# Patient Record
Sex: MALE | Race: WHITE | Employment: OTHER | URBAN - METROPOLITAN AREA
[De-identification: names, ages, dates, MRNs, and addresses within clinical notes are randomized per-mention and may not be internally consistent; named-entity substitution may affect disease eponyms.]

---

## 2020-01-05 ENCOUNTER — HOSPITAL ENCOUNTER (OUTPATIENT)
Dept: MRI IMAGING | Age: 65
Discharge: HOME | End: 2020-01-05
Attending: OPHTHALMOLOGY
Payer: COMMERCIAL

## 2020-01-05 DIAGNOSIS — H49.02 THIRD (OCULOMOTOR) NERVE PALSY, LEFT EYE: ICD-10-CM

## 2020-01-06 ENCOUNTER — HOSPITAL ENCOUNTER (OUTPATIENT)
Dept: MRI IMAGING | Age: 65
Discharge: HOME | End: 2020-01-06
Attending: OPHTHALMOLOGY
Payer: COMMERCIAL

## 2020-01-06 DIAGNOSIS — H49.02 THIRD (OCULOMOTOR) NERVE PALSY, LEFT EYE: ICD-10-CM

## 2020-01-06 LAB — CREAT BLD-MCNC: 1.3 MG/DL (ref 0.7–1.3)

## 2020-01-06 PROCEDURE — 70543 MRI ORBT/FAC/NCK W/O &W/DYE: CPT | Performed by: OPHTHALMOLOGY

## 2020-01-06 PROCEDURE — 70553 MRI BRAIN STEM W/O & W/DYE: CPT | Performed by: OPHTHALMOLOGY

## 2020-01-06 PROCEDURE — 82565 ASSAY OF CREATININE: CPT

## 2020-01-06 PROCEDURE — A9575 INJ GADOTERATE MEGLUMI 0.1ML: HCPCS | Performed by: OPHTHALMOLOGY

## 2020-01-27 ENCOUNTER — LAB ENCOUNTER (OUTPATIENT)
Dept: LAB | Age: 65
End: 2020-01-27
Attending: OPHTHALMOLOGY
Payer: COMMERCIAL

## 2020-01-27 ENCOUNTER — APPOINTMENT (OUTPATIENT)
Dept: LAB | Age: 65
End: 2020-01-27
Payer: COMMERCIAL

## 2020-01-27 DIAGNOSIS — H49.02 THIRD NERVE PALSY OF LEFT EYE: Primary | ICD-10-CM

## 2020-01-27 LAB
ACETONE SERPL QL: NEGATIVE
CRP SERPL HS-MCNC: 2.46 MG/L (ref ?–3)
ERYTHROCYTE [SEDIMENTATION RATE] IN BLOOD: 19 MM/HR (ref 0–20)

## 2020-01-27 PROCEDURE — 82009 KETONE BODYS QUAL: CPT

## 2020-01-27 PROCEDURE — 85652 RBC SED RATE AUTOMATED: CPT

## 2020-01-27 PROCEDURE — 86038 ANTINUCLEAR ANTIBODIES: CPT

## 2020-01-27 PROCEDURE — 36415 COLL VENOUS BLD VENIPUNCTURE: CPT

## 2020-01-27 PROCEDURE — 86592 SYPHILIS TEST NON-TREP QUAL: CPT

## 2020-01-27 PROCEDURE — 86780 TREPONEMA PALLIDUM: CPT

## 2020-01-27 PROCEDURE — 86141 C-REACTIVE PROTEIN HS: CPT

## 2020-01-28 LAB — RAPID PLASMA REAGIN (RPR): NON REACTIVE

## 2020-01-29 LAB
T PALLIDUM AB SER QL: NEGATIVE
TREPONEMA PALLIDUM AB BY TP-PA: NON REACTIVE

## 2020-01-30 LAB — NUCLEAR IGG TITR SER IF: NEGATIVE {TITER}

## 2020-02-22 ENCOUNTER — HOSPITAL ENCOUNTER (OUTPATIENT)
Dept: MRI IMAGING | Facility: HOSPITAL | Age: 65
Discharge: HOME | End: 2020-02-22
Attending: OPHTHALMOLOGY
Payer: COMMERCIAL

## 2020-02-22 DIAGNOSIS — H49.02: ICD-10-CM

## 2020-02-22 PROCEDURE — 70544 MR ANGIOGRAPHY HEAD W/O DYE: CPT | Performed by: OPHTHALMOLOGY

## 2022-09-02 ENCOUNTER — OFFICE VISIT (OUTPATIENT)
Dept: PODIATRY CLINIC | Facility: CLINIC | Age: 67
End: 2022-09-02
Payer: MEDICARE

## 2022-09-02 DIAGNOSIS — E11.42 TYPE 2 DIABETES MELLITUS WITH POLYNEUROPATHY (HCC): Primary | ICD-10-CM

## 2022-09-02 DIAGNOSIS — L84 FOOT CALLUS: ICD-10-CM

## 2022-09-02 DIAGNOSIS — M21.6X2 PLANTAR FLEXED METATARSAL BONE OF LEFT FOOT: ICD-10-CM

## 2022-09-02 DIAGNOSIS — M79.672 LEFT FOOT PAIN: ICD-10-CM

## 2022-09-02 RX ORDER — GLIMEPIRIDE 2 MG/1
2 TABLET ORAL
COMMUNITY
Start: 2019-12-06

## 2022-09-02 RX ORDER — ATORVASTATIN CALCIUM 20 MG/1
1 TABLET, FILM COATED ORAL DAILY
COMMUNITY
Start: 2020-01-30

## 2022-09-02 RX ORDER — BUPROPION HYDROCHLORIDE 150 MG/1
1 TABLET, EXTENDED RELEASE ORAL 2 TIMES DAILY
COMMUNITY
Start: 2020-01-26

## 2022-09-02 RX ORDER — ASPIRIN 81 MG/1
81 TABLET, CHEWABLE ORAL AS DIRECTED
COMMUNITY

## 2022-09-06 ENCOUNTER — OFFICE VISIT (OUTPATIENT)
Dept: OTOLARYNGOLOGY | Facility: CLINIC | Age: 67
End: 2022-09-06
Payer: MEDICARE

## 2022-09-06 VITALS — WEIGHT: 275 LBS | HEIGHT: 69 IN | TEMPERATURE: 98 F | BODY MASS INDEX: 40.73 KG/M2

## 2022-09-06 DIAGNOSIS — H60.8X3 CHRONIC ECZEMATOUS OTITIS EXTERNA OF BOTH EARS: Primary | ICD-10-CM

## 2022-09-06 PROCEDURE — 99213 OFFICE O/P EST LOW 20 MIN: CPT | Performed by: SPECIALIST

## 2022-09-06 PROCEDURE — 3008F BODY MASS INDEX DOCD: CPT | Performed by: SPECIALIST

## 2022-09-06 RX ORDER — FLUOCINOLONE ACETONIDE 0.11 MG/ML
4 OIL AURICULAR (OTIC) NIGHTLY PRN
Qty: 20 ML | Refills: 2 | Status: SHIPPED | OUTPATIENT
Start: 2022-09-06 | End: 2022-10-06

## 2022-09-06 NOTE — PATIENT INSTRUCTIONS
I placed you on dermotic drops at bedtime as needed for chronic eczematoid otitis externa. Follow up or contact me with further problems or questions.

## 2022-10-14 ENCOUNTER — TELEPHONE (OUTPATIENT)
Dept: INTERNAL MEDICINE CLINIC | Facility: CLINIC | Age: 67
End: 2022-10-14

## 2022-10-14 ENCOUNTER — PATIENT MESSAGE (OUTPATIENT)
Dept: INTERNAL MEDICINE CLINIC | Facility: CLINIC | Age: 67
End: 2022-10-14

## 2022-10-14 ENCOUNTER — LAB ENCOUNTER (OUTPATIENT)
Dept: LAB | Age: 67
End: 2022-10-14
Attending: INTERNAL MEDICINE
Payer: MEDICARE

## 2022-10-14 DIAGNOSIS — E11.9 TYPE 2 DIABETES MELLITUS WITHOUT COMPLICATION, WITHOUT LONG-TERM CURRENT USE OF INSULIN (HCC): Primary | ICD-10-CM

## 2022-10-14 DIAGNOSIS — E11.9 TYPE 2 DIABETES MELLITUS WITHOUT COMPLICATION, WITHOUT LONG-TERM CURRENT USE OF INSULIN (HCC): ICD-10-CM

## 2022-10-14 DIAGNOSIS — Z13.228 SCREENING FOR ENDOCRINE, METABOLIC AND IMMUNITY DISORDER: ICD-10-CM

## 2022-10-14 DIAGNOSIS — Z13.29 SCREENING FOR ENDOCRINE, METABOLIC AND IMMUNITY DISORDER: ICD-10-CM

## 2022-10-14 DIAGNOSIS — Z13.0 SCREENING FOR ENDOCRINE, METABOLIC AND IMMUNITY DISORDER: ICD-10-CM

## 2022-10-14 DIAGNOSIS — Z12.5 ENCOUNTER FOR SCREENING FOR MALIGNANT NEOPLASM OF PROSTATE: ICD-10-CM

## 2022-10-14 PROBLEM — F41.8 DEPRESSION WITH ANXIETY: Status: ACTIVE | Noted: 2022-10-14

## 2022-10-14 PROBLEM — E11.59 HYPERTENSION ASSOCIATED WITH TYPE 2 DIABETES MELLITUS: Status: ACTIVE | Noted: 2022-10-14

## 2022-10-14 PROBLEM — E11.59 HYPERTENSION ASSOCIATED WITH TYPE 2 DIABETES MELLITUS (HCC): Status: ACTIVE | Noted: 2022-10-14

## 2022-10-14 PROBLEM — E66.01 CLASS 3 SEVERE OBESITY DUE TO EXCESS CALORIES WITH SERIOUS COMORBIDITY AND BODY MASS INDEX (BMI) OF 45.0 TO 49.9 IN ADULT (HCC): Status: ACTIVE | Noted: 2022-10-14

## 2022-10-14 PROBLEM — E78.5 HYPERLIPIDEMIA ASSOCIATED WITH TYPE 2 DIABETES MELLITUS (HCC): Status: ACTIVE | Noted: 2022-10-14

## 2022-10-14 PROBLEM — E78.5 HYPERLIPIDEMIA ASSOCIATED WITH TYPE 2 DIABETES MELLITUS: Status: ACTIVE | Noted: 2022-10-14

## 2022-10-14 PROBLEM — E11.69 HYPERLIPIDEMIA ASSOCIATED WITH TYPE 2 DIABETES MELLITUS (HCC): Status: ACTIVE | Noted: 2022-10-14

## 2022-10-14 PROBLEM — E11.69 HYPERLIPIDEMIA ASSOCIATED WITH TYPE 2 DIABETES MELLITUS: Status: ACTIVE | Noted: 2022-10-14

## 2022-10-14 PROBLEM — I15.2 HYPERTENSION ASSOCIATED WITH TYPE 2 DIABETES MELLITUS (HCC): Status: ACTIVE | Noted: 2022-10-14

## 2022-10-14 PROBLEM — I15.2 HYPERTENSION ASSOCIATED WITH TYPE 2 DIABETES MELLITUS: Status: ACTIVE | Noted: 2022-10-14

## 2022-10-14 PROBLEM — E66.813 CLASS 3 SEVERE OBESITY DUE TO EXCESS CALORIES WITH SERIOUS COMORBIDITY AND BODY MASS INDEX (BMI) OF 45.0 TO 49.9 IN ADULT (HCC): Status: ACTIVE | Noted: 2022-10-14

## 2022-10-14 PROBLEM — E66.813 CLASS 3 SEVERE OBESITY DUE TO EXCESS CALORIES WITH SERIOUS COMORBIDITY AND BODY MASS INDEX (BMI) OF 45.0 TO 49.9 IN ADULT: Status: ACTIVE | Noted: 2022-10-14

## 2022-10-14 LAB
COMPLEXED PSA SERPL-MCNC: 1.21 NG/ML (ref ?–4)
CREAT UR-SCNC: 156 MG/DL
DEPRECATED RDW RBC AUTO: 38.5 FL (ref 35.1–46.3)
ERYTHROCYTE [DISTWIDTH] IN BLOOD BY AUTOMATED COUNT: 12 % (ref 11–15)
HCT VFR BLD AUTO: 47.6 %
HGB BLD-MCNC: 15.4 G/DL
MCH RBC QN AUTO: 28.1 PG (ref 26–34)
MCHC RBC AUTO-ENTMCNC: 32.4 G/DL (ref 31–37)
MCV RBC AUTO: 86.9 FL
MICROALBUMIN UR-MCNC: 1.48 MG/DL
MICROALBUMIN/CREAT 24H UR-RTO: 9.5 UG/MG (ref ?–30)
PLATELET # BLD AUTO: 226 10(3)UL (ref 150–450)
RBC # BLD AUTO: 5.48 X10(6)UL
TSI SER-ACNC: 3.41 MIU/ML (ref 0.36–3.74)
WBC # BLD AUTO: 8.5 X10(3) UL (ref 4–11)

## 2022-10-14 PROCEDURE — 80061 LIPID PANEL: CPT | Performed by: INTERNAL MEDICINE

## 2022-10-14 PROCEDURE — 82570 ASSAY OF URINE CREATININE: CPT

## 2022-10-14 PROCEDURE — 83036 HEMOGLOBIN GLYCOSYLATED A1C: CPT | Performed by: INTERNAL MEDICINE

## 2022-10-14 PROCEDURE — 80053 COMPREHEN METABOLIC PANEL: CPT | Performed by: INTERNAL MEDICINE

## 2022-10-14 PROCEDURE — 85027 COMPLETE CBC AUTOMATED: CPT

## 2022-10-14 PROCEDURE — 82043 UR ALBUMIN QUANTITATIVE: CPT

## 2022-10-14 PROCEDURE — 84443 ASSAY THYROID STIM HORMONE: CPT

## 2022-10-14 PROCEDURE — 36415 COLL VENOUS BLD VENIPUNCTURE: CPT

## 2022-10-14 NOTE — TELEPHONE ENCOUNTER
From: Yue Mcghee  To: Lonnie Hamilton MD  Sent: 10/14/2022 12:38 PM CDT  Subject: Medications    Hi Doctor,    Just a follow up from my visit this morning. on your instructions, you stated that you were going to prescribe TELMISARTAN 40 mg. But looking at the list of Rx you sent to [facility] I don't see it. Also just as an [place] I called the insurance company, the [facility] is not covered.     Thank Juventino Parish

## 2022-10-14 NOTE — TELEPHONE ENCOUNTER
Tanika from [facility] is following up on medication questions. May leave a message if unable to take call.     Phone # [phone]

## 2022-10-14 NOTE — TELEPHONE ENCOUNTER
Tanika from [address] [facility] is calling to advise PCP they received the electronic prescription and are advising the insurance does not cover the following medication,    Tirzepatide  Are you able to prescribe OZEMPIC    Requesting a call back to go over the other medications prescriptions received as there are alternatives  with low copays.      Please call back at [phone]

## 2022-10-17 RX ORDER — BLOOD SUGAR DIAGNOSTIC
STRIP MISCELLANEOUS
Qty: 100 STRIP | Refills: 3 | Status: SHIPPED | OUTPATIENT
Start: 2022-10-17

## 2022-10-17 RX ORDER — BLOOD-GLUCOSE METER
1 EACH MISCELLANEOUS 2 TIMES DAILY
Qty: 1 KIT | Refills: 0 | Status: SHIPPED | OUTPATIENT
Start: 2022-10-17

## 2022-10-17 RX ORDER — LANCETS
EACH MISCELLANEOUS
Qty: 100 EACH | Refills: 3 | Status: SHIPPED | OUTPATIENT
Start: 2022-10-17

## 2022-10-17 RX ORDER — ISOPROPYL ALCOHOL 0.7 ML/1
SWAB TOPICAL
Qty: 100 EACH | Refills: 3 | Status: SHIPPED | OUTPATIENT
Start: 2022-10-17

## 2022-10-17 NOTE — TELEPHONE ENCOUNTER
The pharmacy will not tell us the cost and wants a prescription but if we send the prescription it results and issues like this. The only thing the pharmacist needs to do is tell us/patient the cost and then it is up to the patient to decide if they want to use this medication, patient has already been instructed if they start these injectables to let us know so I can readdress the current medications. In addition I already gave him an order for diabetes test strips/supplies, these were printed in the office. (It appears the pharmacy is not reading the note to pharmacy nor the signature as it states these rx are for insurance inquiry).

## 2022-10-17 NOTE — TELEPHONE ENCOUNTER
New porter from [facility] is following up. She states she still has questions regarding medication sent on 10/14. Please advise.

## 2022-10-17 NOTE — TELEPHONE ENCOUNTER
Called pharmacy, and spoke to Wilson de Pas (not New porter per note below). I did review the newer meds for diabetes. She wants to make sure that provider wants glimepiride 2 MG Oral Tab,  metFORMIN 1000 MG Oral Tab to continue as well as the others for diabetes. Per pharmacist, patient needs test strips, meter, lancets, tavares device, alcohol wipes. She needs these sent in electronically. How often to check blood sugar daily so we can order the supplies for him?      Please advise

## 2022-10-19 ENCOUNTER — TELEPHONE (OUTPATIENT)
Dept: INTERNAL MEDICINE CLINIC | Facility: CLINIC | Age: 67
End: 2022-10-19

## 2022-10-19 DIAGNOSIS — E11.9 TYPE 2 DIABETES MELLITUS WITHOUT COMPLICATION, WITHOUT LONG-TERM CURRENT USE OF INSULIN (HCC): ICD-10-CM

## 2022-10-19 NOTE — TELEPHONE ENCOUNTER
See medication record, Dell Perez  was paper prescription 10/14/22. RN re sent the electronic script today to [facility].

## 2022-10-19 NOTE — TELEPHONE ENCOUNTER
Tanika from [address] - would like to see if patient can get a prescription for Ozempic.

## 2022-12-01 ENCOUNTER — OFFICE VISIT (OUTPATIENT)
Dept: INTERNAL MEDICINE CLINIC | Facility: CLINIC | Age: 67
End: 2022-12-01
Payer: MEDICARE

## 2022-12-01 VITALS
WEIGHT: 290 LBS | BODY MASS INDEX: 42.95 KG/M2 | SYSTOLIC BLOOD PRESSURE: 103 MMHG | HEIGHT: 69 IN | DIASTOLIC BLOOD PRESSURE: 68 MMHG | HEART RATE: 94 BPM

## 2022-12-01 DIAGNOSIS — E11.9 TYPE 2 DIABETES MELLITUS WITHOUT COMPLICATION, WITHOUT LONG-TERM CURRENT USE OF INSULIN (HCC): ICD-10-CM

## 2022-12-01 DIAGNOSIS — E11.59 HYPERTENSION ASSOCIATED WITH TYPE 2 DIABETES MELLITUS (HCC): ICD-10-CM

## 2022-12-01 DIAGNOSIS — I15.2 HYPERTENSION ASSOCIATED WITH TYPE 2 DIABETES MELLITUS (HCC): ICD-10-CM

## 2022-12-01 DIAGNOSIS — E11.69 HYPERLIPIDEMIA ASSOCIATED WITH TYPE 2 DIABETES MELLITUS (HCC): ICD-10-CM

## 2022-12-01 DIAGNOSIS — E78.5 HYPERLIPIDEMIA ASSOCIATED WITH TYPE 2 DIABETES MELLITUS (HCC): ICD-10-CM

## 2022-12-01 RX ORDER — ATORVASTATIN CALCIUM 40 MG/1
40 TABLET, FILM COATED ORAL NIGHTLY
Qty: 90 TABLET | Refills: 9 | Status: SHIPPED | OUTPATIENT
Start: 2022-12-01

## 2022-12-01 RX ORDER — EMPAGLIFLOZIN 25 MG/1
1 TABLET, FILM COATED ORAL DAILY
Qty: 90 TABLET | Refills: 4 | Status: SHIPPED | OUTPATIENT
Start: 2022-12-01

## 2022-12-01 RX ORDER — GLIMEPIRIDE 1 MG/1
1 TABLET ORAL 2 TIMES DAILY PRN
Qty: 60 TABLET | Refills: 0 | Status: SHIPPED | OUTPATIENT
Start: 2022-12-01

## 2022-12-01 RX ORDER — TELMISARTAN 40 MG/1
40 TABLET ORAL NIGHTLY
Qty: 90 TABLET | Refills: 9 | Status: SHIPPED | OUTPATIENT
Start: 2022-12-01

## 2022-12-01 RX ORDER — SEMAGLUTIDE 1.34 MG/ML
1 INJECTION, SOLUTION SUBCUTANEOUS WEEKLY
Qty: 3 EACH | Refills: 3 | Status: SHIPPED | OUTPATIENT
Start: 2022-12-01

## 2022-12-01 NOTE — PATIENT INSTRUCTIONS
1.  We will continue with telmisartan 40 mg nightly for your blood pressure   2. We will continue with atorvastatin but we will increase from 20 mg nightly to 40 mg nightly, I have sent a new prescription but you may double up on your current 20 mg and take 2 of them at night. 3.  Because you are on quite a few diabetes medications and you are noticing your blood sugar start to be, less than 100 we will now readjust some of your medications.  -Continue Jardiance 25 mg, this medication does not cause low blood sugar  -Continue metformin at 1000 mg twice daily, it is okay to omit the tablets or the entire day dose if your blood sugars are in the low 100s to 120s. -We will increase Ozempic from 0.5 mg to 1 mg, this 1 mg dose will start to produce weight loss and reductions in your blood sugar. --The combination of Ozempic, Jardiance, metformin does not cause hypoglycemia unless you are not eating or vigorously exercising. At this point in time you will stop taking glimepiride and only use this if your blood sugars are greater than 140 after taking Jardiance, metformin, and your weekly dose of Ozempic. Glimepiride is the only medication that you have that is likely to cause hypoglycemia, this is the very first medication we are going to get rid of. Once you are off this medication you will not need to keep checking your blood sugars unless you feel unwell. Over the next few weeks with the higher dose of Ozempic you will start to notice some nausea, some upset stomach but this should resolve. If you are ever unsure it is perfectly fine to not take your oral medications and keep monitoring your blood sugars, let me know. This will require some monitoring on your and until we get you stable. Our goal is to get you on Ozempic alone or Ozempic with Jardiance depending on how you do.

## 2023-01-23 ENCOUNTER — LAB ENCOUNTER (OUTPATIENT)
Dept: LAB | Age: 68
End: 2023-01-23
Attending: INTERNAL MEDICINE
Payer: MEDICARE

## 2023-01-23 DIAGNOSIS — E11.69 HYPERLIPIDEMIA ASSOCIATED WITH TYPE 2 DIABETES MELLITUS (HCC): ICD-10-CM

## 2023-01-23 DIAGNOSIS — E11.9 TYPE 2 DIABETES MELLITUS WITHOUT COMPLICATION, WITHOUT LONG-TERM CURRENT USE OF INSULIN (HCC): ICD-10-CM

## 2023-01-23 DIAGNOSIS — E78.5 HYPERLIPIDEMIA ASSOCIATED WITH TYPE 2 DIABETES MELLITUS (HCC): ICD-10-CM

## 2023-01-23 DIAGNOSIS — E11.59 HYPERTENSION ASSOCIATED WITH TYPE 2 DIABETES MELLITUS (HCC): ICD-10-CM

## 2023-01-23 DIAGNOSIS — I15.2 HYPERTENSION ASSOCIATED WITH TYPE 2 DIABETES MELLITUS (HCC): ICD-10-CM

## 2023-01-23 LAB
ALBUMIN SERPL-MCNC: 4.4 G/DL (ref 3.4–5)
ALBUMIN/GLOB SERPL: 1.2 {RATIO} (ref 1–2)
ALP LIVER SERPL-CCNC: 104 U/L
ALT SERPL-CCNC: 23 U/L
ANION GAP SERPL CALC-SCNC: 5 MMOL/L (ref 0–18)
AST SERPL-CCNC: 14 U/L (ref 15–37)
BILIRUB SERPL-MCNC: 0.5 MG/DL (ref 0.1–2)
BUN BLD-MCNC: 28 MG/DL (ref 7–18)
BUN/CREAT SERPL: 16.3 (ref 10–20)
CALCIUM BLD-MCNC: 10 MG/DL (ref 8.5–10.1)
CHLORIDE SERPL-SCNC: 105 MMOL/L (ref 98–112)
CHOLEST SERPL-MCNC: 89 MG/DL (ref ?–200)
CO2 SERPL-SCNC: 27 MMOL/L (ref 21–32)
CREAT BLD-MCNC: 1.72 MG/DL
CREAT UR-SCNC: 147 MG/DL
EST. AVERAGE GLUCOSE BLD GHB EST-MCNC: 105 MG/DL (ref 68–126)
FASTING PATIENT LIPID ANSWER: YES
FASTING STATUS PATIENT QL REPORTED: YES
GFR SERPLBLD BASED ON 1.73 SQ M-ARVRAT: 43 ML/MIN/1.73M2 (ref 60–?)
GLOBULIN PLAS-MCNC: 3.7 G/DL (ref 2.8–4.4)
GLUCOSE BLD-MCNC: 50 MG/DL (ref 70–99)
HBA1C MFR BLD: 5.3 % (ref ?–5.7)
HDLC SERPL-MCNC: 36 MG/DL (ref 40–59)
LDLC SERPL CALC-MCNC: 26 MG/DL (ref ?–100)
MICROALBUMIN UR-MCNC: 1.74 MG/DL
MICROALBUMIN/CREAT 24H UR-RTO: 11.8 UG/MG (ref ?–30)
NONHDLC SERPL-MCNC: 53 MG/DL (ref ?–130)
OSMOLALITY SERPL CALC.SUM OF ELEC: 287 MOSM/KG (ref 275–295)
POTASSIUM SERPL-SCNC: 4.9 MMOL/L (ref 3.5–5.1)
PROT SERPL-MCNC: 8.1 G/DL (ref 6.4–8.2)
SODIUM SERPL-SCNC: 137 MMOL/L (ref 136–145)
TRIGL SERPL-MCNC: 160 MG/DL (ref 30–149)
VLDLC SERPL CALC-MCNC: 21 MG/DL (ref 0–30)

## 2023-01-23 PROCEDURE — 36415 COLL VENOUS BLD VENIPUNCTURE: CPT

## 2023-01-23 PROCEDURE — 80061 LIPID PANEL: CPT

## 2023-01-23 PROCEDURE — 80053 COMPREHEN METABOLIC PANEL: CPT

## 2023-01-23 PROCEDURE — 83036 HEMOGLOBIN GLYCOSYLATED A1C: CPT

## 2023-01-23 PROCEDURE — 82570 ASSAY OF URINE CREATININE: CPT

## 2023-01-23 PROCEDURE — 82043 UR ALBUMIN QUANTITATIVE: CPT

## 2023-02-13 ENCOUNTER — TELEMEDICINE (OUTPATIENT)
Dept: INTERNAL MEDICINE CLINIC | Facility: CLINIC | Age: 68
End: 2023-02-13

## 2023-02-13 DIAGNOSIS — I15.2 HYPERTENSION ASSOCIATED WITH TYPE 2 DIABETES MELLITUS (HCC): ICD-10-CM

## 2023-02-13 DIAGNOSIS — E11.69 HYPERLIPIDEMIA ASSOCIATED WITH TYPE 2 DIABETES MELLITUS (HCC): ICD-10-CM

## 2023-02-13 DIAGNOSIS — E11.59 HYPERTENSION ASSOCIATED WITH TYPE 2 DIABETES MELLITUS (HCC): ICD-10-CM

## 2023-02-13 DIAGNOSIS — E78.5 HYPERLIPIDEMIA ASSOCIATED WITH TYPE 2 DIABETES MELLITUS (HCC): ICD-10-CM

## 2023-02-13 DIAGNOSIS — E11.9 TYPE 2 DIABETES MELLITUS WITHOUT COMPLICATION, WITHOUT LONG-TERM CURRENT USE OF INSULIN (HCC): Primary | ICD-10-CM

## 2023-02-13 PROCEDURE — 99214 OFFICE O/P EST MOD 30 MIN: CPT | Performed by: INTERNAL MEDICINE

## 2023-02-13 RX ORDER — TELMISARTAN 40 MG/1
40 TABLET ORAL NIGHTLY
Qty: 90 TABLET | Refills: 9 | Status: SHIPPED | OUTPATIENT
Start: 2023-02-13

## 2023-02-13 RX ORDER — SEMAGLUTIDE 2.68 MG/ML
2 INJECTION, SOLUTION SUBCUTANEOUS WEEKLY
Qty: 3 EACH | Refills: 9 | Status: SHIPPED | OUTPATIENT
Start: 2023-02-13

## 2023-02-13 RX ORDER — ASPIRIN 81 MG/1
81 TABLET, CHEWABLE ORAL DAILY
Qty: 90 TABLET | Refills: 9 | Status: SHIPPED | OUTPATIENT
Start: 2023-02-13

## 2023-02-13 RX ORDER — EMPAGLIFLOZIN 25 MG/1
1 TABLET, FILM COATED ORAL DAILY
Qty: 90 TABLET | Refills: 9 | Status: SHIPPED | OUTPATIENT
Start: 2023-02-13

## 2023-02-13 RX ORDER — ATORVASTATIN CALCIUM 40 MG/1
40 TABLET, FILM COATED ORAL NIGHTLY
Qty: 90 TABLET | Refills: 9 | Status: SHIPPED | OUTPATIENT
Start: 2023-02-13

## 2023-03-09 ENCOUNTER — PATIENT MESSAGE (OUTPATIENT)
Dept: INTERNAL MEDICINE CLINIC | Facility: CLINIC | Age: 68
End: 2023-03-09

## 2023-03-09 RX ORDER — BLOOD SUGAR DIAGNOSTIC
STRIP MISCELLANEOUS
Qty: 200 STRIP | Refills: 3 | Status: SHIPPED | OUTPATIENT
Start: 2023-03-09

## 2023-03-09 NOTE — TELEPHONE ENCOUNTER
From: Jhonny Marinelli  To: Maria D Henderson MD  Sent: 3/9/2023 2:58 PM CST  Subject: Request for test strips    Can you send a RX to [facility] test strips.     Thank you,  Jhonny Marinelli

## 2023-03-14 ENCOUNTER — PATIENT MESSAGE (OUTPATIENT)
Dept: INTERNAL MEDICINE CLINIC | Facility: CLINIC | Age: 68
End: 2023-03-14

## 2023-03-14 DIAGNOSIS — E11.9 TYPE 2 DIABETES MELLITUS WITHOUT COMPLICATION, WITHOUT LONG-TERM CURRENT USE OF INSULIN (HCC): ICD-10-CM

## 2023-03-15 RX ORDER — BLOOD SUGAR DIAGNOSTIC
1 STRIP MISCELLANEOUS 2 TIMES DAILY
Qty: 200 STRIP | Refills: 3 | Status: SHIPPED | OUTPATIENT
Start: 2023-03-15

## 2023-03-15 RX ORDER — BLOOD-GLUCOSE METER
1 EACH MISCELLANEOUS 2 TIMES DAILY
Qty: 1 KIT | Refills: 0 | Status: SHIPPED | OUTPATIENT
Start: 2023-03-15

## 2023-03-15 NOTE — TELEPHONE ENCOUNTER
From: Zaire Cárdenas  To: Edmund Leary MD  Sent: 3/14/2023 12:40 PM CDT  Subject: Authorization for test strips    My insurance company [facility]) does not want to cover the cost of the test strips, prior authorization is needed. Can you please contact the insurance company in order to get authorization.     Thank you,   Zaire Cárdenas

## 2023-06-08 ENCOUNTER — TELEPHONE (OUTPATIENT)
Dept: INTERNAL MEDICINE CLINIC | Facility: CLINIC | Age: 68
End: 2023-06-08

## 2023-06-08 NOTE — TELEPHONE ENCOUNTER
Pt due for DM/LIPID/BP follow up with dr. Zara Colby in about 4 months (around 6/13/2023) for DM/LIPID/BP, GET LABS DONE 3 DAYS BEFORE VISIT. Jadon Coronado  \"  LMTCB, if pt calls back please schedule an appointment

## 2023-06-15 ENCOUNTER — TELEPHONE (OUTPATIENT)
Dept: INTERNAL MEDICINE CLINIC | Facility: CLINIC | Age: 68
End: 2023-06-15

## 2023-07-19 ENCOUNTER — TELEPHONE (OUTPATIENT)
Dept: INTERNAL MEDICINE CLINIC | Facility: CLINIC | Age: 68
End: 2023-07-19

## 2023-08-28 NOTE — TELEPHONE ENCOUNTER
----- Message from Luann Pollard DO sent at 8/27/2023  8:54 AM EDT -----  Labs look good. A1c is down to 6.2%. Lyme is negative.  Kidney function and electrolytes are normal. Call pt to schedule annual wellness visit.  Left message on 7/19/23

## 2024-01-11 DIAGNOSIS — E11.9 TYPE 2 DIABETES MELLITUS WITHOUT COMPLICATION, WITHOUT LONG-TERM CURRENT USE OF INSULIN (HCC): ICD-10-CM

## 2024-01-12 NOTE — TELEPHONE ENCOUNTER
[employer] message sent to pt, await reply   Per med list Rx was d/c'd.    Protocol Failed/ No Protocol    Requested Prescriptions   Pending Prescriptions Disp Refills    METFORMIN HCL 1000 MG Oral Tab [Pharmacy Med Name: METFORMIN 1000MG TABLETS] 180 tablet 3     Sig: TAKE 1 TABLET(1000 MG) BY MOUTH TWICE DAILY WITH MEALS FOR DIABETES       Diabetes Medication Protocol Failed - 1/11/2024  8:34 AM        Failed - Last A1C < 7.5 and within past 6 months     Lab Results   Component Value Date    A1C 5.3 01/23/2023             Failed - In person appointment or virtual visit in the past 6 mos or appointment in next 3 mos     Recent Outpatient Visits              11 months ago Type 2 diabetes mellitus without complication, without long-term current use of insulin ([facility])    [facility], Ra Balbuena MD    Telemedicine    1 year ago Type 2 diabetes mellitus without complication, without long-term current use of insulin ([facility]    [facility]    1 year ago Depression with anxiety    [facility]    [facility] Visit    1 year ago Chronic eczematous otitis externa of both ears    [facility] [place]    Office Visit    1 year ago Type 2 diabetes mellitus with polyneuropathy ([facility])    [facility], Jamari Ivy DPM    Office Visit                      Failed - EGFRCR or GFRNAA > 50     GFR Evaluation  EGFRCR: 43 , resulted on 1/23/2023          Passed - GFR in the past 12 months               Recent Outpatient Visits              11 months ago Type 2 diabetes mellitus without complication, without long-term current use of insulin ([facility])    [facility], Ra Balbuena MD    Telemedicine    1 year ago Type 2 diabetes mellitus without  complication, without long-term current use of insulin ([facility])    [facility], Ra Balbuena MD    Office Visit    1 year ago Depression with anxiety    [facility], Ra Balbuena MD    Office Visit    1 year ago Chronic eczematous otitis externa of both ears    [facility], Rita Hernández MD    Office Visit    1 year ago Type 2 diabetes mellitus with polyneuropathy ([facility])    [facility], Jamari Ivy DPM    Office Visit

## 2024-01-13 RX ORDER — SEMAGLUTIDE 2.68 MG/ML
2 INJECTION, SOLUTION SUBCUTANEOUS WEEKLY
Qty: 3 EACH | Refills: 9 | Status: SHIPPED | OUTPATIENT
Start: 2024-01-13

## 2024-01-13 RX ORDER — EMPAGLIFLOZIN 25 MG/1
1 TABLET, FILM COATED ORAL DAILY
Qty: 90 TABLET | Refills: 9 | Status: SHIPPED | OUTPATIENT
Start: 2024-01-13

## 2024-01-13 NOTE — TELEPHONE ENCOUNTER
Left message to call back to confirm understanding of Dr Ely's instructions.  Also sent MyChart message.    No future appointments.

## 2024-01-13 NOTE — TELEPHONE ENCOUNTER
RN Triage at corporate site to call pt with Dr De Los Santos message below. Thank you.    Please reply to pool: EM RN TRIAGE

## 2024-01-13 NOTE — TELEPHONE ENCOUNTER
Please call to schedule diabetes follow up visit in ASAP. Get labs nonfasting 3 days before visit- ordered  The meds that are expensive are standard of care so they are likely refused as its been over a year since he's been seen/needs new rx. I will resend the meds, please have him check cose of ozempic/jardiance, proceed with any prior auths given he has type 2 diabetes.   Metformin refilled pending follow up.     Arrive 15 min early.     NEW LOCATION: [address], [place].

## 2024-01-15 NOTE — TELEPHONE ENCOUNTER
Sent to call center staff to call and assist patient to schedule visit.     Patient did view [employer] message as per below but has not scheduled yet.       Last read by Chilango Olson at  9:20 AM on 1/15/2024.

## 2024-01-15 NOTE — TELEPHONE ENCOUNTER
2nd attempt/first below. Left voice mail message for pt to call back. Please, see message below when the call is returned.

## 2024-03-05 ENCOUNTER — PATIENT OUTREACH (OUTPATIENT)
Facility: LOCATION | Age: 69
End: 2024-03-05

## 2024-05-21 ENCOUNTER — TELEPHONE (OUTPATIENT)
Facility: LOCATION | Age: 69
End: 2024-05-21

## 2024-05-21 DIAGNOSIS — E78.5 HYPERLIPIDEMIA ASSOCIATED WITH TYPE 2 DIABETES MELLITUS (HCC): ICD-10-CM

## 2024-05-21 DIAGNOSIS — E11.69 HYPERLIPIDEMIA ASSOCIATED WITH TYPE 2 DIABETES MELLITUS (HCC): ICD-10-CM

## 2024-05-21 DIAGNOSIS — E11.9 TYPE 2 DIABETES MELLITUS WITHOUT COMPLICATION, WITHOUT LONG-TERM CURRENT USE OF INSULIN (HCC): ICD-10-CM

## 2024-05-21 DIAGNOSIS — I15.2 HYPERTENSION ASSOCIATED WITH TYPE 2 DIABETES MELLITUS (HCC): ICD-10-CM

## 2024-05-21 DIAGNOSIS — E11.59 HYPERTENSION ASSOCIATED WITH TYPE 2 DIABETES MELLITUS (HCC): ICD-10-CM

## 2024-05-21 NOTE — TELEPHONE ENCOUNTER
Pharmacy requesting refill     aspirin 81 MG Oral Chew Tab Chew 1 tablet (81 mg total) by mouth daily. 90 tablet 9

## 2024-05-24 NOTE — TELEPHONE ENCOUNTER
Please review. Protocol Failed; No Protocol  Televisit: 2/13/2023  No future appointments.   [employer] message sent to patient to schedule an office visit with Provider and/or  Routed to Patient  for assistance with appointment.     Requested Prescriptions   Pending Prescriptions Disp Refills    aspirin 81 MG Oral Chew Tab 90 tablet 3     Sig: Chew 1 tablet (81 mg total) by mouth daily.       Aspirin Protocol Failed - 5/21/2024  5:01 PM        Failed - In person appointment or virtual visit in the past 6 mos or appointment in next 3 mos     Recent Outpatient Visits              1 year ago Type 2 diabetes mellitus without complication, without long-term current use of insulin ([facility])    [facility]    1 year ago Type 2 diabetes mellitus without complication, without long-term current use of insulin ([facility])    [facility]    Office Visit    1 year [facility]    [facility]    1 year ago Chronic eczematous otitis externa of both ears    [facility]    Office Visit    1 year ago Type 2 diabetes mellitus with polyneuropathy ([facility])    [facility]    Office Visit                               Recent Outpatient Visits              1 year ago Type 2 diabetes mellitus without complication, without long-term current use of insulin ([facility])    [facility]    [facility]    1 year ago Type 2 diabetes mellitus without complication, without long-term current use of insulin ([facility])    [facility]    Office Visit    1 year ago Depression with anxiety    [place]  [facility] [place] [place]    Office Visit    1 year ago Chronic eczematous otitis externa of both ears    [facility], Rita Hernández MD    Office Visit    1 year ago Type 2 diabetes mellitus with polyneuropathy (H[facility])    [facility], Jamari Ivy DPM    Office Visit

## 2024-05-27 RX ORDER — ASPIRIN 81 MG/1
81 TABLET, CHEWABLE ORAL DAILY
Qty: 90 TABLET | Refills: 0 | OUTPATIENT
Start: 2024-05-27

## 2024-07-25 DIAGNOSIS — E11.69 HYPERLIPIDEMIA ASSOCIATED WITH TYPE 2 DIABETES MELLITUS (HCC): ICD-10-CM

## 2024-07-25 DIAGNOSIS — E11.9 TYPE 2 DIABETES MELLITUS WITHOUT COMPLICATION, WITHOUT LONG-TERM CURRENT USE OF INSULIN (HCC): ICD-10-CM

## 2024-07-25 DIAGNOSIS — E78.5 HYPERLIPIDEMIA ASSOCIATED WITH TYPE 2 DIABETES MELLITUS (HCC): ICD-10-CM

## 2024-07-30 RX ORDER — ATORVASTATIN CALCIUM 40 MG/1
40 TABLET, FILM COATED ORAL NIGHTLY
Qty: 90 TABLET | Refills: 3 | OUTPATIENT
Start: 2024-07-30

## 2024-07-30 NOTE — TELEPHONE ENCOUNTER
Please Review. Protocol Failed; No Protocol   Mycharted patient to schedule an appointment.     Requested Prescriptions   Pending Prescriptions Disp Refills    ATORVASTATIN 40 MG Oral Tab [Pharmacy Med Name: ATORVASTATIN 40MG TABLETS] 90 tablet 9     Sig: TAKE 1 TABLET(40 MG) BY MOUTH EVERY NIGHT FOR CHOLESTEROL       Cholesterol Medication Protocol Failed - 7/25/2024 10:13 AM        Failed - ALT < 80     Lab Results   Component Value Date    ALT 23 01/23/2023             Failed - ALT resulted within past year        Failed - Lipid panel within past 12 months     Lab Results   Component Value Date    CHOLEST 89 01/23/2023    TRIG 160 (H) 01/23/2023    HDL 36 (L) 01/23/2023    LDL 26 01/23/2023    VLDL 21 01/23/2023    NONHDLC 53 01/23/2023             Failed - In person appointment or virtual visit in the past 12 mos or appointment in next 3 mos     Recent Outpatient Visits              1 year ago Type 2 diabetes mellitus without complication, without long-term current use of insulin ([facility]    [facility], Ra Balbuena MD    Telemedicine    1 year ago Type 2 diabetes mellitus without complication, without long-term current use of insulin ([facility])    [facility], aR Balbuena MD    Office Visit    1 year ago Depression with anxiety    [facility], Ra Balbuena MD    Office Visit    1 year ago Chronic eczematous otitis externa of both ears    [facility], Rita Hernández MD    Office Visit    1 year ago Type 2 diabetes mellitus with polyneuropathy ([facility])    [facility], Jamari Ivy DPM    Office Visit                               Recent Outpatient Visits              1 year ago Type 2 diabetes mellitus without complication, without long-term current use of insulin ([facility])    [facility],  Ra Balbuena MD    Telemedicine    1 year ago Type 2 diabetes mellitus without complication, without long-term current use of insulin ([facility])    [facility], Ra Balbuena MD    Office Visit    1 year ago Depression with anxiety    [facility], Ra Balbuena MD    Office Visit    1 year ago Chronic eczematous otitis externa of both ears    [facility] [place]    Office Visit    1 year ago Type 2 diabetes mellitus with polyneuropathy ([facility])    [facility], Jamari Ivy DPM    Office Visit

## 2024-10-24 ENCOUNTER — TELEPHONE (OUTPATIENT)
Facility: LOCATION | Age: 69
End: 2024-10-24

## 2024-11-12 ENCOUNTER — TELEPHONE (OUTPATIENT)
Facility: LOCATION | Age: 69
End: 2024-11-12

## 2025-05-13 ENCOUNTER — TELEPHONE (OUTPATIENT)
Facility: LOCATION | Age: 70
End: 2025-05-13

## 2025-05-13 NOTE — TELEPHONE ENCOUNTER
Called phone rang twice and went straight to voicemail  . Advised pt he is due for office visit, physical, care gap management. Or to please call office back if no longer with our orgnization if he has a new pcp to please let us know so order for removal can be processed

## 2025-06-11 ENCOUNTER — TELEPHONE (OUTPATIENT)
Dept: INTERNAL MEDICINE CLINIC | Facility: CLINIC | Age: 70
End: 2025-06-11

## 2025-06-11 NOTE — TELEPHONE ENCOUNTER
Please relay to patient or Listed contact if unable to reach:  Phan Ely is unfortunately no longer with our organization,  My name is Dr. Chapa and I'm located in [facility] and assigned to his former patients thru [facility].   I am reaching out in regards to remind you that  you are due forAnnual Physical Exam, Establish care visit with New Provider, and Colorectal cancer screening: given your age of greater than 45 I highly recommend you get your colorectal cancer screening if you decline screening colonoscopy a non invasive option is a stool Cologuard test which is done every three years, For average risk patients. Please follow up in clinic and we can discuss which option is best for you. I strive for delivering High quality/value care to my patients. If you have any questions, please schedule follow up with me  If you have already had a colonoscopy or Cologuard recently completed, please send Fax the records to our office [phone] However if you have established care with another provider please call our office [phone] or send us a [facility] message and we will remove your name from our list to indicate you have a new provider and will gladly send records to them if requested. for more information,  A Video on how to collect the cologuard sample on youtube: titled: How to properly use Cologuard colon cancer at-home test kit https://www.Bolt.com/watch?v=[email or web address]    Please complete this within the next month as I value providing high value evidence based medical care and prevention and would like to go over the next steps needed In your wellness endeavor.     However if you have established care with another provider please call our office [phone] or send us a [facility] message and we will remove your name from our list to indicate you have a new provider and will gladly send records to them if requested.      Wish you all the best in your endeavor for  better health  -Dr. Ceferino Chapa MD, MPH

## 2025-08-12 ENCOUNTER — TELEPHONE (OUTPATIENT)
Dept: INTERNAL MEDICINE CLINIC | Facility: CLINIC | Age: 70
End: 2025-08-12